# Patient Record
Sex: FEMALE | Race: WHITE | Employment: FULL TIME
[De-identification: names, ages, dates, MRNs, and addresses within clinical notes are randomized per-mention and may not be internally consistent; named-entity substitution may affect disease eponyms.]

---

## 2022-03-18 PROBLEM — J44.9 COPD (CHRONIC OBSTRUCTIVE PULMONARY DISEASE) (HCC): Status: ACTIVE | Noted: 2018-02-12

## 2022-03-19 PROBLEM — E87.6 HYPOKALEMIA: Status: ACTIVE | Noted: 2018-02-12

## 2022-03-19 PROBLEM — E87.20 LACTIC ACIDOSIS: Status: ACTIVE | Noted: 2018-02-12

## 2022-03-19 PROBLEM — R09.02 HYPOXIA: Status: ACTIVE | Noted: 2018-02-12

## 2023-05-23 ENCOUNTER — HOSPITAL ENCOUNTER (OUTPATIENT)
Facility: HOSPITAL | Age: 68
Setting detail: RECURRING SERIES
Discharge: HOME | End: 2023-05-26
Payer: MEDICARE

## 2023-05-23 PROCEDURE — 97162 PT EVAL MOD COMPLEX 30 MIN: CPT

## 2023-05-23 NOTE — PROGRESS NOTES
PHYSICAL / OCCUPATIONAL THERAPY - DAILY TREATMENT NOTE (updated )  For Eval visit    Patient Name: Mayte James    Date: 2023    : 1955  Insurance: Payor: Marce Keene / Plan: Marce Keene / Product Type: *No Product type* /      Patient  verified yes     Visit #   Current / Total 1 8   Time   In / Out 10:10 10:51   Pain   In / Out 3/10 5/10   Subjective Functional Status/Changes: See POC   Changes to:  Meds, Allergies, Med Hx, Sx Hx? If yes, update Summary List no       TREATMENT AREA =  see POC    OBJECTIVE           26 min   Eval - untimed                      Therapeutic Procedures: Tx Min Billable or 1:1 Min (if diff from Tx Min) Procedure, Rationale, Specifics   5  97379 Therapeutic Exercise (timed):  increase ROM, strength, coordination, balance, and proprioception to improve patient's ability to progress to PLOF and address remaining functional goals.  (see flow sheet as applicable)     Details if applicable:  HEP, see chart copy            Details if applicable:            Details if applicable:            Details if applicable:            Details if applicable:     5  [facility] Totals Reminder: bill using total billable min of TIMED therapeutic procedures (example: do not include dry needle or estim unattended, both untimed codes, in totals to left)  8-22 min = 1 unit; 23-37 min = 2 units; 38-52 min = 3 units; 53-67 min = 4 units; 68-82 min = 5 units   Total Total     [x]  Patient Education billed concurrently with other procedures   [x] Review HEP    [] Progressed/Changed HEP, detail:    [] Other detail:       Objective Information/Functional Measures/Assessment    See POC    Patient will continue to benefit from skilled PT / OT services to modify and progress therapeutic interventions, analyze and address functional mobility deficits, analyze and address ROM deficits, analyze and address strength deficits, analyze and address soft tissue restrictions, analyze and cue for proper
decrease ROM, decrease strength, decrease ADL/functional abilities, decrease activity tolerance, decrease flexibility/joint mobility, and decrease transfer abilities    Treatment Plan may include any combination of the followin Therapeutic Exercise, 52139 Neuromuscular Re-Education, 11798 Manual Therapy, 38111 Therapeutic Activity, 50995 Self Care/Home Management, 00302 Electrical Stim unattended, and 03888 Mechanical Traction  Patient / Family readiness to learn indicated by: asking questions, trying to perform skills, interest, return verbalization , and return demonstration   Persons(s) to be included in education: patient (P)  Barriers to Learning/Limitations: yes; scheduling  Measures taken if barriers to learning present: pt only able to attend 1x/week 2' scheduling/. Plan to regularly update HEP. Patient Goal (s): \"relieve stiffness, lessen the pain\"  Patient Self Reported Health Status: good  Rehabilitation Potential: fair    Short Term Goals: To be accomplished in 4 weeks  Pt will be ind and compliant with HEP for self management of sx  Pt will improve L c/s rotation AROM by at least 15 degrees to improve ease of checking blind spot while driving  Dec max pain </= 5/10 to improve QOL  Long Term Goals:  To be accomplished in 8 weeks  Improve L  strength by at least 12# to improve ability to carry objects in L hand  Pt will maintain deep cervical flexion > 15\" to improve cervical stability for seated/standing activities  Improve FOTO score to >/= 61/100 to indicate improved function    Frequency / Duration: Patient to be seen 1 times per week for 8 weeks    Patient/ Caregiver education and instruction: Diagnosis, prognosis, self care and exercises [x]  Plan of care has been reviewed with PTA    Certification Period: 2023 - 2023    Alma George, PT       2023       9:48 AM    Payor: Ilda Saravia / Plan: Ilda Saravia / Product Type: *No Product type* /

## 2023-06-05 ENCOUNTER — APPOINTMENT (OUTPATIENT)
Facility: HOSPITAL | Age: 68
End: 2023-06-05
Payer: MEDICARE

## 2023-06-06 ENCOUNTER — HOSPITAL ENCOUNTER (OUTPATIENT)
Facility: HOSPITAL | Age: 68
Setting detail: RECURRING SERIES
Discharge: HOME | End: 2023-06-09
Payer: MEDICARE

## 2023-06-06 PROCEDURE — 97140 MANUAL THERAPY 1/> REGIONS: CPT

## 2023-06-06 PROCEDURE — 97530 THERAPEUTIC ACTIVITIES: CPT

## 2023-06-06 PROCEDURE — 97110 THERAPEUTIC EXERCISES: CPT

## 2023-06-06 NOTE — PROGRESS NOTES
driving  Dec max pain </= 5/10 to improve QOL  Long Term Goals:  To be accomplished in 8 weeks  Improve L  strength by at least 12# to improve ability to carry objects in L hand  Pt will maintain deep cervical flexion > 15\" to improve cervical stability for seated/standing activities  Improve FOTO score to >/= 61/100 to indicate improved function       PLAN  Yes  Continue plan of care  [x]  Upgrade activities as tolerated  []  Discharge due to :  []  Other:    Anam Burdick PTA    6/6/2023    1:49 PM    Future Appointments   Date Time Provider Dorian Pedroza   6/12/2023  1:40 PM Anam Burdick PTA [facility]   6/19/2023  1:40 PM Nkechi Farrell PT [facility]   6/26/2023  1:40 PM [facility] PT [facility] 1 [facility]

## 2023-06-19 ENCOUNTER — HOSPITAL ENCOUNTER (OUTPATIENT)
Facility: HOSPITAL | Age: 68
Setting detail: RECURRING SERIES
Discharge: HOME | End: 2023-06-22
Payer: MEDICARE

## 2023-06-19 PROCEDURE — 97140 MANUAL THERAPY 1/> REGIONS: CPT

## 2023-06-19 PROCEDURE — 97110 THERAPEUTIC EXERCISES: CPT

## 2023-06-19 NOTE — PROGRESS NOTES
PHYSICAL / OCCUPATIONAL THERAPY - DAILY TREATMENT NOTE (updated )    Patient Name: Albert Burnett    Date: 2023    : 1955  Insurance: Payor: Leti Proper / Plan: Leti Proper / Product Type: *No Product type* /      Patient  verified Yes     Visit #   Current / Total 4 8   Time   In / Out 1:44 2:18   Pain   In / Out 3/10 2/10   Subjective Functional Status/Changes: Pt reports no significant change to the L UE radicular sx, however denies any UE sx upon arrival to clinic today. TREATMENT AREA =  Cervicalgia [M54.2]    OBJECTIVE         Therapeutic Procedures: Tx Min Billable or 1:1 Min (if diff from Tx Min) Procedure, Rationale, Specifics    97808 Therapeutic Exercise (timed):  increase ROM, strength, coordination, balance, and proprioception to improve patient's ability to progress to PLOF and address remaining functional goals. (see flow sheet as applicable)     Details if applicable:        78902 Manual Therapy (timed):  decrease pain, increase ROM, increase tissue extensibility, decrease trigger points, and increase postural awareness to improve patient's ability to progress to PLOF and address remaining functional goals. The manual therapy interventions were performed at a separate and distinct time from the therapeutic activities interventions .  (see flow sheet as applicable)     Details if applicable:  STM to L UT, lev scap, gentle SOR; manual c/s traction          Details if applicable:            Details if applicable:      [facility] Totals Reminder: bill using total billable min of TIMED therapeutic procedures (example: do not include dry needle or estim unattended, both untimed codes, in totals to left)  8-22 min = 1 unit; 23-37 min = 2 units; 38-52 min = 3 units; 53-67 min = 4 units; 68-82 min = 5 units   Total Total     [x]  Patient Education billed concurrently with other procedures   [] Review HEP    [] Progressed/Changed HEP, detail:    [] Other detail:
cervical stability for seated/standing activities  Status at last Eval: unable; immediate SCM compensation with chin tuck  Current Status: continues with SCM compensation with chin tuck iso  Goal Met?  no    3. Improve FOTO Score to >/= 61/100 to indicate improved function  Status at last Eval: 55/100  Current Status: NT  Goal Met?  n/a    Payor: [employer] / Plan: [employer] / Product Type: *No Product type* /     Medicare, cannot change goals, cannot adjust frequency/duration, no signature required   Reporting Period: (date from last Prog Note/Eval to current Prog Note/Recert)  7/51/5058 - 8/56/7867    RECOMMENDATIONS  Continue therapy per initial Plan of Care or most recent Medicare Recert. If you have any questions/comments please contact us directly. Thank you for allowing us to assist in the care of your patient.     Elana Ag, PT       6/19/2023       2:36 PM

## 2023-06-26 ENCOUNTER — HOSPITAL ENCOUNTER (OUTPATIENT)
Facility: HOSPITAL | Age: 68
Setting detail: RECURRING SERIES
End: 2023-06-26
Payer: MEDICARE